# Patient Record
Sex: FEMALE | Race: WHITE
[De-identification: names, ages, dates, MRNs, and addresses within clinical notes are randomized per-mention and may not be internally consistent; named-entity substitution may affect disease eponyms.]

---

## 2019-03-25 ENCOUNTER — HOSPITAL ENCOUNTER (OUTPATIENT)
Dept: HOSPITAL 75 - RAD | Age: 49
End: 2019-03-25
Attending: OBSTETRICS & GYNECOLOGY
Payer: COMMERCIAL

## 2019-03-25 DIAGNOSIS — R92.8: ICD-10-CM

## 2019-03-25 DIAGNOSIS — Z12.31: Primary | ICD-10-CM

## 2019-03-25 PROCEDURE — 77067 SCR MAMMO BI INCL CAD: CPT

## 2019-03-26 NOTE — DIAGNOSTIC IMAGING REPORT
INDICATION: Routine screening.



COMPARISON: Comparison is made with prior mammograms from

10/26/2017 and 10/29/2015.



TECHNIQUE: 2D and 3D bilateral screening mammography was

performed with computer-aided detection (CAD) system.



FINDINGS: Both breasts remain heterogeneously dense, limiting the

sensitivity of mammography. No mass or malignant appearing

microcalcifications are seen. The axillae are unremarkable.



IMPRESSION: No mammographic features suspicious for malignancy

are identified.



ACR BI-RADS Category 1: Negative.

Result letter will be mailed to the patient.

Note:  At least 10% of breast cancer is not imaged by

mammography.



Dictated by: 



  Dictated on workstation # [id number]

## 2020-02-04 ENCOUNTER — HOSPITAL ENCOUNTER (OUTPATIENT)
Dept: HOSPITAL 75 - RAD | Age: 50
End: 2020-02-04
Attending: OBSTETRICS & GYNECOLOGY
Payer: COMMERCIAL

## 2020-02-04 DIAGNOSIS — N83.202: ICD-10-CM

## 2020-02-04 DIAGNOSIS — D25.9: Primary | ICD-10-CM

## 2020-02-04 PROCEDURE — 76830 TRANSVAGINAL US NON-OB: CPT

## 2020-02-04 PROCEDURE — 76856 US EXAM PELVIC COMPLETE: CPT

## 2020-02-04 NOTE — DIAGNOSTIC IMAGING REPORT
PROCEDURE: US Non-ob pelvis comp/trans.



TECHNIQUE:  Multiple real-time grayscale images were obtained of

the pelvis in various projections endovaginally.



Transabdominal imaging was also performed.



INDICATION:  Right-sided pelvic fullness.



FINDINGS: Uterus measures 8.7 x 4.3 x 4.6 cm. Endometrium is 3 mm

in thickness. Hypoechoic myometrial masses are noted, largest

approximately 14 mm x 12 mm. Second nodule is approximately 13 mm

x 11 mm. These may represent fibroids. Right ovary measures 3.6 x

1.6 x 1.4 cm and the left ovary measures 4.1 x 3.5 x 3.1 cm. Left

ovary does contain a 3.3 x 2.8 cm cyst. No free fluid is

detected.



IMPRESSION:

1. Fibroid uterus.

2. 3.3 cm left ovarian cyst.



Dictated by: 



  Dictated on workstation # [id number]

## 2020-06-02 ENCOUNTER — HOSPITAL ENCOUNTER (OUTPATIENT)
Dept: HOSPITAL 75 - RAD | Age: 50
End: 2020-06-02
Attending: OBSTETRICS & GYNECOLOGY
Payer: COMMERCIAL

## 2020-06-02 DIAGNOSIS — Z12.31: Primary | ICD-10-CM

## 2020-06-02 PROCEDURE — 77067 SCR MAMMO BI INCL CAD: CPT

## 2020-06-02 PROCEDURE — 77063 BREAST TOMOSYNTHESIS BI: CPT

## 2020-06-02 NOTE — DIAGNOSTIC IMAGING REPORT
EXAM: Digital mammogram bilateral screening



COMPARISONS: 3/25/2019 and 10/26/2017.



There are no current complaints. 



The current study was also evaluated with a Computer Aided

Detection (CAD) system.



FINDINGS: The fibroglandular tissue in both breasts is

heterogeneously dense. This does limit the sensitivity of this

exam. Overall, there does not appear to have been any significant

change when compared to the prior study. No primary or secondary

sign of malignancy is noted.



IMPRESSION: There is no radiographic evidence for malignancy.



ACR BI-RADS Category 1: Negative.

Result letter will be mailed to the patient.

Note:  At least 10% of breast cancer is not imaged by

mammography.



Dictated by: 



  Dictated on workstation # [id number]

## 2021-06-15 ENCOUNTER — HOSPITAL ENCOUNTER (OUTPATIENT)
Dept: HOSPITAL 75 - RAD | Age: 51
End: 2021-06-15
Attending: OBSTETRICS & GYNECOLOGY
Payer: COMMERCIAL

## 2021-06-15 DIAGNOSIS — Z12.31: Primary | ICD-10-CM

## 2021-06-15 DIAGNOSIS — D25.9: ICD-10-CM

## 2021-06-15 DIAGNOSIS — N83.201: ICD-10-CM

## 2021-06-15 PROCEDURE — 76830 TRANSVAGINAL US NON-OB: CPT

## 2021-06-15 PROCEDURE — 76856 US EXAM PELVIC COMPLETE: CPT

## 2021-06-15 PROCEDURE — 77063 BREAST TOMOSYNTHESIS BI: CPT

## 2021-06-15 PROCEDURE — 77067 SCR MAMMO BI INCL CAD: CPT

## 2021-06-15 NOTE — DIAGNOSTIC IMAGING REPORT
INDICATION: 

Routine screening.



COMPARISON:    

06/02/2020 and 03/25/2019.



TECHNIQUE: 

2D and 3D bilateral screening mammography was performed with CAD.



FINDINGS:

Both breasts are heterogeneously dense, limiting the sensitivity

of mammography. The parenchymal pattern is stable. No mass or

malignant appearing microcalcifications are seen. The axillae are

unremarkable.



IMPRESSION: 

No mammographic features suspicious for malignancy are

identified.



ACR BI-RADS Category 1: Negative.

Result letter will be mailed to the patient.

Note:  At least 10% of breast cancer is not imaged by

mammography.



Dictated by: 



  Dictated on workstation # [id number]

## 2021-06-15 NOTE — DIAGNOSTIC IMAGING REPORT
PROCEDURE: 

Pelvic comp/transvaginal sonogram.



TECHNIQUE: 

Complete transabdominal and transvaginal pelvic ultrasound was

performed. In addition, limited pelvic Doppler was performed.



INDICATION: Uterine fibroids.



COMPARISON: Correlation is made with prior ultrasound from

02/04/2020.



FINDINGS: Uterus measures 6.6 x 3.7 x 3.6 cm and is retroverted.

Endometrium is 3 mm in thickness. There is significant

heterogeneity to the uterine myometrium. There appears to be a

posterior fibroid of approximately 10 mm x 7 mm x 10 mm in size.

This measures slightly smaller than prior exam. Additional

fibroids seen on prior exam are not seen on today's study. Right

ovary measures 3.7 x 2.0 x 1.7 cm and the left ovary measures 2.5

x 1.5 x 1.6 cm. There are two cysts involving the right ovary. A

partially collapsed cyst on the right measures 15 mm x 11 mm x 10

mm. A second cyst measures 17 mm x 14 mm x 12 mm. There is blood

flow to both ovaries. Trace free fluid in the cul-de-sac is

noted.



IMPRESSION:

1. Heterogeneous myometrium with small uterine fibroid

posteriorly.

2. Right-sided ovarian cysts, as described.



Dictated by: 



  Dictated on workstation # [id number]

## 2022-07-07 ENCOUNTER — HOSPITAL ENCOUNTER (OUTPATIENT)
Dept: HOSPITAL 75 - RAD | Age: 52
End: 2022-07-07
Attending: OBSTETRICS & GYNECOLOGY
Payer: COMMERCIAL

## 2022-07-07 DIAGNOSIS — Z12.31: Primary | ICD-10-CM

## 2022-07-07 PROCEDURE — 77067 SCR MAMMO BI INCL CAD: CPT

## 2022-07-07 PROCEDURE — 77063 BREAST TOMOSYNTHESIS BI: CPT

## 2022-07-07 NOTE — DIAGNOSTIC IMAGING REPORT
Indication: Routine screening.



Comparison is made with prior mammogram from 06/15/2021 and

06/02/2020.



2-D and 3-D bilateral screening mammography was performed with

CAD.



CAD is utilized.



The current study was also evaluated with a Computer Aided

Detection (CAD) system.



Both breasts are heterogeneously dense, limiting the sensitivity

of mammography. The parenchymal pattern is stable. No mass or

malignant-appearing microcalcifications are seen. Axillae are

unremarkable.



IMPRESSION: BI-RADS Category 1



No mammographic features suspicious for malignancy are

identified.



ACR BI-RADS Category 1: Negative.

Result letter will be mailed to the patient.

Note:  At least 10% of breast cancer is not imaged by

mammography.



Dictated by: 



  Dictated on workstation # [id number]

## 2022-08-02 ENCOUNTER — HOSPITAL ENCOUNTER (OUTPATIENT)
Dept: HOSPITAL 75 - RAD | Age: 52
End: 2022-08-02
Attending: STUDENT IN AN ORGANIZED HEALTH CARE EDUCATION/TRAINING PROGRAM
Payer: COMMERCIAL

## 2022-08-02 DIAGNOSIS — S83.241A: ICD-10-CM
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
## 2022-08-02 DIAGNOSIS — S83.281A: Primary | ICD-10-CM

## 2022-08-02 DIAGNOSIS — M22.41: ICD-10-CM

## 2022-08-02 PROCEDURE — 73721 MRI JNT OF LWR EXTRE W/O DYE: CPT

## 2022-08-02 NOTE — DIAGNOSTIC IMAGING REPORT
PROCEDURE: MRI right joint lower extremity without contrast.



TECHNIQUE: Multiplanar, multisequence non contrast-enhanced MRI

of the right lower extremity was accomplished.



INDICATION:  Chondromalacia the right patella



COMPARISON: None



FINDINGS:



No acute fracture seen in the right knee. Alignment appears

normal. There is a minimal right knee joint effusion.



The articular cartilage in the patellofemoral compartment

demonstrates a full-thickness cartilage loss over the median

ridge measuring about 1 cm transverse, with heterogeneity and

surface irregularity elsewhere. The articular cartilage in the

medial compartment demonstrates mild thinning with no large

full-thickness defects. The cartilage in the lateral compartment

demonstrates marked full-thickness loss along the weightbearing

aspect.



The medial meniscus appears mildly diminutive, but no definite

tear is seen. The lateral meniscus demonstrates intrasubstance

degeneration with a radial tear at the root and a horizontal tear

of the body.



The anterior and posterior cruciate ligaments are intact. The

medial collateral ligament appears intact. The lateral collateral

ligamentous complex is intact. The extensor mechanism is intact.

The medial and lateral retinacula are intact. Soft tissues about

the right knee are otherwise unremarkable.



IMPRESSION:

1. Full-thickness cartilage loss at the weightbearing lateral

compartment and a full-thickness cartilage defect at the median

ridge of the patella.

2. Tearing of the lateral meniscus.



Dictated by: 



  Dictated on workstation # [id number]